# Patient Record
Sex: MALE | Race: WHITE | NOT HISPANIC OR LATINO | Employment: FULL TIME | URBAN - NONMETROPOLITAN AREA
[De-identification: names, ages, dates, MRNs, and addresses within clinical notes are randomized per-mention and may not be internally consistent; named-entity substitution may affect disease eponyms.]

---

## 2024-11-29 ENCOUNTER — APPOINTMENT (OUTPATIENT)
Dept: PRIMARY CARE | Facility: CLINIC | Age: 39
End: 2024-11-29
Payer: COMMERCIAL

## 2025-03-21 ENCOUNTER — HOSPITAL ENCOUNTER (OUTPATIENT)
Dept: RADIOLOGY | Facility: CLINIC | Age: 40
Discharge: HOME | End: 2025-03-21
Payer: COMMERCIAL

## 2025-03-21 ENCOUNTER — OFFICE VISIT (OUTPATIENT)
Dept: PRIMARY CARE | Facility: CLINIC | Age: 40
End: 2025-03-21
Payer: COMMERCIAL

## 2025-03-21 VITALS
RESPIRATION RATE: 16 BRPM | BODY MASS INDEX: 29.41 KG/M2 | TEMPERATURE: 98.2 F | WEIGHT: 192 LBS | HEART RATE: 74 BPM | DIASTOLIC BLOOD PRESSURE: 86 MMHG | OXYGEN SATURATION: 95 % | SYSTOLIC BLOOD PRESSURE: 124 MMHG

## 2025-03-21 DIAGNOSIS — M67.972 DISORDER OF LEFT ACHILLES TENDON: ICD-10-CM

## 2025-03-21 DIAGNOSIS — M67.972 DISORDER OF LEFT ACHILLES TENDON: Primary | ICD-10-CM

## 2025-03-21 PROCEDURE — 99214 OFFICE O/P EST MOD 30 MIN: CPT | Performed by: FAMILY MEDICINE

## 2025-03-21 PROCEDURE — 73610 X-RAY EXAM OF ANKLE: CPT | Mod: LT

## 2025-03-22 NOTE — PROGRESS NOTES
Subjective   Patient ID: Danilo Fox is a 39 y.o. male who presents for Mass (Bump on back of ankle. X 1 month, painful on/off.) and Cerumen Impaction.  HPI  Ear , left feels full  2 .  Bump on back of left ankle and pain  in the same area     Review of Systems  Remote injury( cow stepped on foot)  .  No hx fx.   No recent direct injury, ankle / knee/ foot sprains.      Has work boots he wears , and they have not changed.       Objective   /86 (BP Location: Left arm, Patient Position: Sitting, BP Cuff Size: Large adult)   Pulse 74   Temp 36.8 °C (98.2 °F) (Temporal)   Resp 16   Wt 87.1 kg (192 lb)   SpO2 95%   BMI 29.41 kg/m²     Physical Exam    Alert. No distress    Pertinent exam : neruovasc intact bilateral lower extremity .    Irregular nodularity, left achilles. No calf pain . No asymmetry .      Ears.  Normal canal and TM. No obstruction/ fluids     Assessment/Plan   Problem List Items Addressed This Visit    None  Visit Diagnoses       Disorder of left Achilles tendon    -  Primary          Tendinosis  .  Pain for about a month .  Recommend stretch ham/ calf.  Heel cup in left boot.  Image to r/o bony injury  .      CALIN Zamudio MD